# Patient Record
Sex: FEMALE | Race: WHITE
[De-identification: names, ages, dates, MRNs, and addresses within clinical notes are randomized per-mention and may not be internally consistent; named-entity substitution may affect disease eponyms.]

---

## 2020-05-05 ENCOUNTER — HOSPITAL ENCOUNTER (OUTPATIENT)
Dept: HOSPITAL 47 - RADUSMAIN | Age: 45
Discharge: HOME | End: 2020-05-05
Attending: FAMILY MEDICINE
Payer: COMMERCIAL

## 2020-05-05 DIAGNOSIS — K76.0: Primary | ICD-10-CM

## 2020-05-05 PROCEDURE — 76700 US EXAM ABDOM COMPLETE: CPT

## 2020-05-05 NOTE — US
EXAMINATION TYPE: US abdomen complete

 

DATE OF EXAM: 5/5/2020

 

COMPARISON: NONE

 

CLINICAL HISTORY: R10.9 abdominal pain.

 

EXAM MEASUREMENTS:

 

Liver Length:  17.5 cm   

Gallbladder Wall:  Surgically absent    

CBD:  0.6 cm

Spleen:  7.1 cm   

Right Kidney:  11.3 x 4.4 x 4.9 cm 

Left Kidney:  11.4 x 6.0 x 5.5 cm   

 

 

 

Pancreas:  Obscured by bowel gas

Liver: Coarse, heterogeneous echotexture. Measuring upper limits of normal  

Gallbladder:  Surgically absent 

**Evidence for sonographic Benz's sign:  No

CBD:  distal portion obscured by bowel gas  

Spleen:  wnl   

Right Kidney:  No hydronephrosis or masses seen   

Left Kidney:  No hydronephrosis or masses seen   

Upper IVC:  wnl  

Abd Aorta:  Atherosclerotic changes visualized, visualized portions show no AAA

 

 

 

The liver is heterogeneous.  The intrahepatic portion of the IVC and proximal abdominal aorta are wit
hin normal limits.   Common bile duct is unremarkable.  The visualized portions of the pancreas are h
omogenous.  The spleen is unremarkable.  Kidneys are symmetric and free of hydronephrosis.  No renal 
lesions are seen.

 

IMPRESSION:

 

Mild fatty liver. Otherwise unremarkable study.